# Patient Record
Sex: FEMALE | Race: WHITE | NOT HISPANIC OR LATINO | URBAN - METROPOLITAN AREA
[De-identification: names, ages, dates, MRNs, and addresses within clinical notes are randomized per-mention and may not be internally consistent; named-entity substitution may affect disease eponyms.]

---

## 2020-12-21 ENCOUNTER — LAB OUTSIDE AN ENCOUNTER (OUTPATIENT)
Dept: URBAN - METROPOLITAN AREA CLINIC 40 | Facility: CLINIC | Age: 60
End: 2020-12-21

## 2020-12-21 ENCOUNTER — WEB ENCOUNTER (OUTPATIENT)
Dept: URBAN - METROPOLITAN AREA CLINIC 40 | Facility: CLINIC | Age: 60
End: 2020-12-21

## 2020-12-21 ENCOUNTER — OFFICE VISIT (OUTPATIENT)
Dept: URBAN - METROPOLITAN AREA CLINIC 40 | Facility: CLINIC | Age: 60
End: 2020-12-21
Payer: COMMERCIAL

## 2020-12-21 DIAGNOSIS — K58.0 IRRITABLE BOWEL SYNDROME WITH DIARRHEA: ICD-10-CM

## 2020-12-21 DIAGNOSIS — I48.0 PAROXYSMAL ATRIAL FIBRILLATION: ICD-10-CM

## 2020-12-21 DIAGNOSIS — K21.9 GERD WITHOUT ESOPHAGITIS: ICD-10-CM

## 2020-12-21 DIAGNOSIS — R10.11 ABDOMINAL PAIN, RIGHT UPPER QUADRANT: ICD-10-CM

## 2020-12-21 DIAGNOSIS — R10.84 ABDOMINAL PAIN, GENERALIZED: ICD-10-CM

## 2020-12-21 DIAGNOSIS — Z86.010 HX OF COLONIC POLYPS: ICD-10-CM

## 2020-12-21 PROCEDURE — G8484 FLU IMMUNIZE NO ADMIN: HCPCS | Performed by: INTERNAL MEDICINE

## 2020-12-21 PROCEDURE — G2100 PT 66+ FRAILTY AND MED DEM: HCPCS | Performed by: INTERNAL MEDICINE

## 2020-12-21 PROCEDURE — 1036F TOBACCO NON-USER: CPT | Performed by: INTERNAL MEDICINE

## 2020-12-21 PROCEDURE — 99214 OFFICE O/P EST MOD 30 MIN: CPT | Performed by: INTERNAL MEDICINE

## 2020-12-21 PROCEDURE — G8430 EC AT DOC MEDREC PT NOT ELIG: HCPCS | Performed by: INTERNAL MEDICINE

## 2020-12-21 PROCEDURE — G8417 CALC BMI ABV UP PARAM F/U: HCPCS | Performed by: INTERNAL MEDICINE

## 2020-12-21 RX ORDER — ATORVASTATIN CALCIUM 10 MG/1
1 TABLET TABLET, FILM COATED ORAL ONCE A DAY
Status: ACTIVE | COMMUNITY

## 2020-12-21 RX ORDER — LOSARTAN POTASSIUM 100 MG/1
1 TABLET TABLET ORAL ONCE A DAY
Status: ACTIVE | COMMUNITY

## 2020-12-21 RX ORDER — DICYCLOMINE HYDROCHLORIDE 20 MG/1
1 TABLET TABLET ORAL THREE TIMES A DAY
Qty: 90 | OUTPATIENT
Start: 2020-12-21 | End: 2021-01-20

## 2020-12-21 RX ORDER — SODIUM, POTASSIUM,MAG SULFATES 17.5-3.13G
177ML AS DIRECTED SOLUTION, RECONSTITUTED, ORAL ORAL ONCE
Qty: 1 | Refills: 0 | OUTPATIENT
Start: 2020-12-21 | End: 2020-12-22

## 2020-12-21 RX ORDER — AMLODIPINE BESYLATE 10 MG/1
1 TABLET TABLET ORAL ONCE A DAY
Status: ACTIVE | COMMUNITY

## 2020-12-21 NOTE — HPI-TODAY'S VISIT:
CT 8/2020 at [place], no acute abnormality. Diverticulosis and gallstones, no duct dilation or diverticulitis. Patient presents today for evaluation of abdominal pain.  She also has a longstanding history of acid reflux and a history of colon polyps. For the past year or 2 she has described intermittent moderate to significant abdominal pain in multiple locations.  The main issue is a left flank and left upper quadrant abdominal pain that is there most days of every week.  This does come and go but it is always noticeable with no alleviating or aggravating factors.  She also has a lower abdominal pain bilaterally, worse when she bends forward.  She has an umbilical pain with no alleviating or aggravating factors.  She also describes a right upper quadrant abdominal pain that is dull and grabbing.  She had evaluation for this pain in August with a CT scan showing diverticulosis and gallstones as noted above, there was no inflammation.  Her bowels are regular she has 1-3 bowel movements daily no overt diarrhea.  Rare rectal white bleeding. She also has hemorrhoids.  Typical symptoms with pain and discomfort and itching and now with a knot in the perianal region or a lump that is palpable. She also has a longstanding history of acid reflux.  She takes Nexium over-the-counter daily and recently had a prescription for another PPI. EGD 2015 showed a small hiatal hernia and gastritis, biopsies were negative for H. pylori. The other issue is a history of a large colon polyp.  She had colonoscopy in 2012 with removal of a 2 cm pedunculated adenoma, also small hyperplastic polyp, also known history of hemorrhoids and diverticulosis.  She was set to have surveillance colonoscopy in 2015 but this was deferred for various reasons.  She is well overdue for surveillance at this time. Patient presents today for evaluation of abdominal pain.  She also has a longstanding history of acid reflux and a history of colon polyps. For the past year or 2 she has described intermittent moderate to significant abdominal pain in multiple locations.  The main issue is a left flank and left upper quadrant abdominal pain that is there most days of every week.  This does come and go but it is always noticeable with no alleviating or aggravating factors.  She also has a lower abdominal pain bilaterally, worse when she bends forward.  She has an umbilical pain with no alleviating or aggravating factors.  She also describes a right upper quadrant abdominal pain that is dull and grabbing.  She had evaluation for this pain in August with a CT scan showing diverticulosis and gallstones as noted above, there was no inflammation.  Her bowels are regular she has 1-3 bowel movements daily no overt diarrhea.  Rare rectal white bleeding. She also has hemorrhoids.  Typical symptoms with pain and discomfort and itching and now with a knot in the perianal region or a lump that is palpable. She also has a longstanding history of acid reflux.  She takes Nexium over-the-counter daily and recently had a prescription for another PPI. EGD 2015 showed a small hiatal hernia and gastritis, biopsies were negative for H. pylori. The other issue is a history of a large colon polyp.  She had colonoscopy in 2012 with removal of a 2 cm pedunculated adenoma, also small hyperplastic polyp, also known history of hemorrhoids and diverticulosis.  She was set to have surveillance colonoscopy in 2015 but this was deferred for various reasons.  She is well overdue for surveillance at this time.

## 2020-12-23 ENCOUNTER — TELEPHONE ENCOUNTER (OUTPATIENT)
Dept: URBAN - METROPOLITAN AREA CLINIC 40 | Facility: CLINIC | Age: 60
End: 2020-12-23

## 2021-01-04 ENCOUNTER — OFFICE VISIT (OUTPATIENT)
Dept: URBAN - METROPOLITAN AREA CLINIC 40 | Facility: CLINIC | Age: 61
End: 2021-01-04

## 2021-01-20 ENCOUNTER — TELEPHONE ENCOUNTER (OUTPATIENT)
Dept: URBAN - METROPOLITAN AREA CLINIC 40 | Facility: CLINIC | Age: 61
End: 2021-01-20

## 2021-01-21 ENCOUNTER — TELEPHONE ENCOUNTER (OUTPATIENT)
Dept: URBAN - METROPOLITAN AREA CLINIC 40 | Facility: CLINIC | Age: 61
End: 2021-01-21

## 2021-01-22 ENCOUNTER — OFFICE VISIT (OUTPATIENT)
Dept: URBAN - METROPOLITAN AREA SURGERY CENTER 30 | Facility: SURGERY CENTER | Age: 61
End: 2021-01-22
Payer: COMMERCIAL

## 2021-01-22 DIAGNOSIS — K63.89 BACTERIAL OVERGROWTH SYNDROME: ICD-10-CM

## 2021-01-22 DIAGNOSIS — Z86.010 H/O ADENOMATOUS POLYP OF COLON: ICD-10-CM

## 2021-01-22 DIAGNOSIS — D12.3 ADENOMA OF TRANSVERSE COLON: ICD-10-CM

## 2021-01-22 PROCEDURE — 45385 COLONOSCOPY W/LESION REMOVAL: CPT | Performed by: INTERNAL MEDICINE

## 2021-01-22 PROCEDURE — G8907 PT DOC NO EVENTS ON DISCHARG: HCPCS | Performed by: INTERNAL MEDICINE

## 2021-01-22 RX ORDER — LOSARTAN POTASSIUM 100 MG/1
1 TABLET TABLET ORAL ONCE A DAY
Status: ACTIVE | COMMUNITY

## 2021-01-22 RX ORDER — ATORVASTATIN CALCIUM 10 MG/1
1 TABLET TABLET, FILM COATED ORAL ONCE A DAY
Status: ACTIVE | COMMUNITY

## 2021-01-22 RX ORDER — AMLODIPINE BESYLATE 10 MG/1
1 TABLET TABLET ORAL ONCE A DAY
Status: ACTIVE | COMMUNITY

## 2021-01-28 ENCOUNTER — DASHBOARD ENCOUNTERS (OUTPATIENT)
Age: 61
End: 2021-01-28

## 2021-02-01 ENCOUNTER — OFFICE VISIT (OUTPATIENT)
Dept: URBAN - METROPOLITAN AREA CLINIC 40 | Facility: CLINIC | Age: 61
End: 2021-02-01
Payer: COMMERCIAL

## 2021-02-01 DIAGNOSIS — I48.0 PAROXYSMAL ATRIAL FIBRILLATION: ICD-10-CM

## 2021-02-01 DIAGNOSIS — Z86.010 HX OF COLONIC POLYPS: ICD-10-CM

## 2021-02-01 DIAGNOSIS — R10.84 ABDOMINAL PAIN, GENERALIZED: ICD-10-CM

## 2021-02-01 DIAGNOSIS — K21.9 GERD WITHOUT ESOPHAGITIS: ICD-10-CM

## 2021-02-01 DIAGNOSIS — R10.11 ABDOMINAL PAIN, RIGHT UPPER QUADRANT: ICD-10-CM

## 2021-02-01 DIAGNOSIS — K58.0 IRRITABLE BOWEL SYNDROME WITH DIARRHEA: ICD-10-CM

## 2021-02-01 PROBLEM — 197125005: Status: ACTIVE | Noted: 2020-12-21

## 2021-02-01 PROBLEM — 428283002: Status: ACTIVE | Noted: 2020-12-21

## 2021-02-01 PROBLEM — 282825002: Status: ACTIVE | Noted: 2020-12-21

## 2021-02-01 PROBLEM — 266435005: Status: ACTIVE | Noted: 2020-12-21

## 2021-02-01 PROCEDURE — G8427 DOCREV CUR MEDS BY ELIG CLIN: HCPCS | Performed by: INTERNAL MEDICINE

## 2021-02-01 PROCEDURE — G8484 FLU IMMUNIZE NO ADMIN: HCPCS | Performed by: INTERNAL MEDICINE

## 2021-02-01 PROCEDURE — 99214 OFFICE O/P EST MOD 30 MIN: CPT | Performed by: INTERNAL MEDICINE

## 2021-02-01 PROCEDURE — G8422 PT INELIG BMI CALCULATION: HCPCS | Performed by: INTERNAL MEDICINE

## 2021-02-01 PROCEDURE — 1036F TOBACCO NON-USER: CPT | Performed by: INTERNAL MEDICINE

## 2021-02-01 PROCEDURE — G2100 PT 66+ FRAILTY AND MED DEM: HCPCS | Performed by: INTERNAL MEDICINE

## 2021-02-01 RX ORDER — AMLODIPINE BESYLATE 10 MG/1
1 TABLET TABLET ORAL ONCE A DAY
Status: ACTIVE | COMMUNITY

## 2021-02-01 RX ORDER — LOSARTAN POTASSIUM 100 MG/1
1 TABLET TABLET ORAL ONCE A DAY
Status: ACTIVE | COMMUNITY

## 2021-02-01 RX ORDER — DICYCLOMINE HYDROCHLORIDE 20 MG/1
1 TABLET TABLET ORAL THREE TIMES A DAY
Qty: 90 | OUTPATIENT
Start: 2021-02-01 | End: 2021-03-03

## 2021-02-01 RX ORDER — ATORVASTATIN CALCIUM 10 MG/1
1 TABLET TABLET, FILM COATED ORAL ONCE A DAY
Status: ACTIVE | COMMUNITY

## 2021-02-01 NOTE — PHYSICAL EXAM GASTROINTESTINAL
Abdomen , soft, nontender, nondistended , no guarding or rigidity , no masses palpable , normal bowel sounds , Liver and Spleen , no hepatomegaly present , no hepatosplenomegaly , liver nontender , spleen not palpable , Abdomen , soft, nontender, nondistended , no guarding or rigidity , no masses palpable , normal bowel sounds , Liver and Spleen , no hepatomegaly present , no hepatosplenomegaly , liver nontender , spleen not palpable , Rectal , normal sphincter tone , no internal hemorrhoids, rectal masses or bleeding present

## 2021-02-01 NOTE — HPI-TODAY'S VISIT:
Follow up after testing. HIDA was abnormal, EF 10%, symptomatic with CCK. Colonoscopy, 3 TA polyps removed, diverticulsosis, no IBD. CT showed gallstones.  From prior notes: History of colon polyps noted and discussed. We will schedule surveillance colonoscopy at this time.  Multiple different abdominal pains but mainly left and right upper quadrant and lower pelvis, symptoms suspicious for functional type pain and irritable bowel syndrome. I will start dicyclomine. CT 8/2020 at [place], no acute abnormality. Diverticulosis and gallstones, no duct dilation or diverticulitis. Patient presents today for evaluation of abdominal pain.  She also has a longstanding history of acid reflux and a history of colon polyps. For the past year or 2 she has described intermittent moderate to significant abdominal pain in multiple locations.  The main issue is a left flank and left upper quadrant abdominal pain that is there most days of every week.  This does come and go but it is always noticeable with no alleviating or aggravating factors.  She also has a lower abdominal pain bilaterally, worse when she bends forward.  She has an umbilical pain with no alleviating or aggravating factors.  She also describes a right upper quadrant abdominal pain that is dull and grabbing.  She had evaluation for this pain in August with a CT scan showing diverticulosis and gallstones as noted above, there was no inflammation.  Her bowels are regular she has 1-3 bowel movements daily no overt diarrhea.  Rare rectal white bleeding. She also has hemorrhoids.  Typical symptoms with pain and discomfort and itching and now with a knot in the perianal region or a lump that is palpable. She also has a longstanding history of acid reflux.  She takes Nexium over-the-counter daily and recently had a prescription for another PPI. EGD 2015 showed a small hiatal hernia and gastritis, biopsies were negative for H. pylori. The other issue is a history of a large colon polyp.  She had colonoscopy in 2012 with removal of a 2 cm pedunculated adenoma, also small hyperplastic polyp, also known history of hemorrhoids and diverticulosis.  She was set to have surveillance colonoscopy in 2015 but this was deferred for various reasons.  She is well overdue for surveillance at this time. Patient presents today for evaluation of abdominal pain.  She also has a longstanding history of acid reflux and a history of colon polyps. For the past year or 2 she has described intermittent moderate to significant abdominal pain in multiple locations.  The main issue is a left flank and left upper quadrant abdominal pain that is there most days of every week.  This does come and go but it is always noticeable with no alleviating or aggravating factors.  She also has a lower abdominal pain bilaterally, worse when she bends forward.  She has an umbilical pain with no alleviating or aggravating factors.  She also describes a right upper quadrant abdominal pain that is dull and grabbing.  She had evaluation for this pain in August with a CT scan showing diverticulosis and gallstones as noted above, there was no inflammation.  Her bowels are regular she has 1-3 bowel movements daily no overt diarrhea.  Rare rectal white bleeding. She also has hemorrhoids.  Typical symptoms with pain and discomfort and itching and now with a knot in the perianal region or a lump that is palpable. She also has a longstanding history of acid reflux.  She takes Nexium over-the-counter daily and recently had a prescription for another PPI. EGD 2015 showed a small hiatal hernia and gastritis, biopsies were negative for H. pylori. The other issue is a history of a large colon polyp.  She had colonoscopy in 2012 with removal of a 2 cm pedunculated adenoma, also small hyperplastic polyp, also known history of hemorrhoids and diverticulosis.  She was set to have surveillance colonoscopy in 2015 but this was deferred for various reasons.  She is well overdue for surveillance at this time.

## 2021-04-12 ENCOUNTER — OFFICE VISIT (OUTPATIENT)
Dept: URBAN - METROPOLITAN AREA CLINIC 40 | Facility: CLINIC | Age: 61
End: 2021-04-12

## 2021-10-12 ENCOUNTER — APPOINTMENT (RX ONLY)
Dept: URBAN - METROPOLITAN AREA OTHER 10 | Facility: OTHER | Age: 61
Setting detail: DERMATOLOGY
End: 2021-10-12

## 2021-10-12 DIAGNOSIS — D22 MELANOCYTIC NEVI: ICD-10-CM

## 2021-10-12 DIAGNOSIS — L81.8 OTHER SPECIFIED DISORDERS OF PIGMENTATION: ICD-10-CM

## 2021-10-12 DIAGNOSIS — L82.0 INFLAMED SEBORRHEIC KERATOSIS: ICD-10-CM

## 2021-10-12 DIAGNOSIS — L91.8 OTHER HYPERTROPHIC DISORDERS OF THE SKIN: ICD-10-CM

## 2021-10-12 DIAGNOSIS — L57.0 ACTINIC KERATOSIS: ICD-10-CM

## 2021-10-12 DIAGNOSIS — L72.0 EPIDERMAL CYST: ICD-10-CM

## 2021-10-12 DIAGNOSIS — D18.0 HEMANGIOMA: ICD-10-CM

## 2021-10-12 PROBLEM — D18.01 HEMANGIOMA OF SKIN AND SUBCUTANEOUS TISSUE: Status: ACTIVE | Noted: 2021-10-12

## 2021-10-12 PROBLEM — D22.39 MELANOCYTIC NEVI OF OTHER PARTS OF FACE: Status: ACTIVE | Noted: 2021-10-12

## 2021-10-12 PROCEDURE — ? COUNSELING

## 2021-10-12 PROCEDURE — ? LIQUID NITROGEN

## 2021-10-12 PROCEDURE — 17110 DESTRUCTION B9 LES UP TO 14: CPT

## 2021-10-12 PROCEDURE — ? SKIN TAG REMOVAL (COSMETIC)

## 2021-10-12 PROCEDURE — ? DEFER

## 2021-10-12 PROCEDURE — 17000 DESTRUCT PREMALG LESION: CPT | Mod: 59

## 2021-10-12 PROCEDURE — 99203 OFFICE O/P NEW LOW 30 MIN: CPT | Mod: 25

## 2021-10-12 ASSESSMENT — LOCATION ZONE DERM
LOCATION ZONE: NECK
LOCATION ZONE: LEG
LOCATION ZONE: TRUNK
LOCATION ZONE: FACE

## 2021-10-12 ASSESSMENT — LOCATION DETAILED DESCRIPTION DERM
LOCATION DETAILED: RIGHT SUPERIOR LATERAL NECK
LOCATION DETAILED: LEFT SUPERIOR LATERAL BUCCAL CHEEK
LOCATION DETAILED: LEFT DISTAL PRETIBIAL REGION
LOCATION DETAILED: RIGHT CLAVICULAR NECK
LOCATION DETAILED: LEFT ANTERIOR PROXIMAL THIGH
LOCATION DETAILED: LEFT INFERIOR MEDIAL MALAR CHEEK
LOCATION DETAILED: LEFT BUTTOCK
LOCATION DETAILED: RIGHT INFERIOR LATERAL NECK
LOCATION DETAILED: LEFT CENTRAL MALAR CHEEK
LOCATION DETAILED: EPIGASTRIC SKIN
LOCATION DETAILED: LEFT MEDIAL SUPERIOR CHEST

## 2021-10-12 ASSESSMENT — LOCATION SIMPLE DESCRIPTION DERM
LOCATION SIMPLE: ABDOMEN
LOCATION SIMPLE: CHEST
LOCATION SIMPLE: LEFT THIGH
LOCATION SIMPLE: LEFT CHEEK
LOCATION SIMPLE: LEFT PRETIBIAL REGION
LOCATION SIMPLE: LEFT BUTTOCK
LOCATION SIMPLE: RIGHT ANTERIOR NECK

## 2021-10-12 NOTE — PROCEDURE: LIQUID NITROGEN
Render Post-Care Instructions In Note?: no
Duration Of Freeze Thaw-Cycle (Seconds): 5
Show Aperture Variable?: Yes
Detail Level: Detailed
Number Of Freeze-Thaw Cycles: 1 freeze-thaw cycle
Post-Care Instructions: Patient instructed to keep the area clean with soap and water or Hibiclens.  Avoid picking at any of the treated lesions. Pt may soak any crusted or scabbed areas with hydrogen peroxide and cover with Vaseline and a bandage if they would like.
Consent: The patient's consent was obtained including but not limited to risks of redness, swelling, blistering, crusting, scabbing, scarring, darker or lighter pigment change, incomplete removal, recurrence, and infection.
Post-Care Instructions: Instructed to keep the area clean with soap and water or Hibiclens. Avoid picking at any of the treated lesions. If desired, you may soak any with hydrogen peroxide to remove surface crusting and then cover with Vaseline ointment and a bandage to keep the area protected if you wish to do so.
Medical Necessity Information: It is in your best interest to select a reason for this procedure from the list below. All of these items fulfill various CMS LCD requirements except the new and changing color options.
Medical Necessity Clause: This procedure was medically necessary because the lesions that were treated were:

## 2021-10-12 NOTE — PROCEDURE: COUNSELING
Detail Level: Detailed
Patient Specific Counseling (Will Not Stick From Patient To Patient): Compression stocking
Detail Level: Zone
Detail Level: Simple

## 2021-10-12 NOTE — PROCEDURE: SKIN TAG REMOVAL (COSMETIC)
Detail Level: Detailed
Anesthesia Volume In Cc: 0.2
Price (Use Numbers Only, No Special Characters Or $): 150
Hemostasis: Aluminum Chloride and Electrocautery
Anesthesia Type: 2% lidocaine with epinephrine
Removed With: scissors
Consent: Written consent obtained and the risks of skin tag removal was reviewed with the patient including but not limited to bleeding, pigmentary change, infection, pain, and remote possibility of scarring.

## 2023-01-18 ENCOUNTER — TELEPHONE ENCOUNTER (OUTPATIENT)
Dept: URBAN - METROPOLITAN AREA CLINIC 40 | Facility: CLINIC | Age: 63
End: 2023-01-18

## 2023-02-23 ENCOUNTER — OFFICE VISIT (OUTPATIENT)
Dept: URBAN - METROPOLITAN AREA CLINIC 40 | Facility: CLINIC | Age: 63
End: 2023-02-23

## 2023-03-03 ENCOUNTER — OFFICE VISIT (OUTPATIENT)
Dept: URBAN - METROPOLITAN AREA CLINIC 40 | Facility: CLINIC | Age: 63
End: 2023-03-03